# Patient Record
(demographics unavailable — no encounter records)

---

## 2024-10-24 NOTE — PHYSICAL EXAM
Upon review of the In Basket request we were able to locate, review, and update the patient chart as requested for Diabetic Foot Exam     Any additional questions or concerns should be emailed to the Practice Liaisons via [email or web address] email, please do not reply via In Basket      Thank you  Zi Parish MA [Alert] : alert [No Acute Distress] : no acute distress [Normocephalic] : normocephalic [Conjunctivae with no discharge] : conjunctivae with no discharge [PERRL] : PERRL [EOMI Bilateral] : EOMI bilateral [Auricles Well Formed] : auricles well formed [Clear Tympanic membranes with present light reflex and bony landmarks] : clear tympanic membranes with present light reflex and bony landmarks [No Discharge] : no discharge [Nares Patent] : nares patent [Pink Nasal Mucosa] : pink nasal mucosa [Palate Intact] : palate intact [Nonerythematous Oropharynx] : nonerythematous oropharynx [Supple, full passive range of motion] : supple, full passive range of motion [No Palpable Masses] : no palpable masses [Symmetric Chest Rise] : symmetric chest rise [Clear to Auscultation Bilaterally] : clear to auscultation bilaterally [Regular Rate and Rhythm] : regular rate and rhythm [Normal S1, S2 present] : normal S1, S2 present [No Murmurs] : no murmurs [+2 Femoral Pulses] : +2 femoral pulses [Soft] : soft [NonTender] : non tender [Non Distended] : non distended [Normoactive Bowel Sounds] : normoactive bowel sounds [No Hepatomegaly] : no hepatomegaly [No Splenomegaly] : no splenomegaly [Maximo: _____] : Maximo [unfilled] [Testicles Descended Bilaterally] : testicles descended bilaterally [Patent] : patent [No fissures] : no fissures [No Abnormal Lymph Nodes Palpated] : no abnormal lymph nodes palpated [No Gait Asymmetry] : no gait asymmetry [No pain or deformities with palpation of bone, muscles, joints] : no pain or deformities with palpation of bone, muscles, joints [Normal Muscle Tone] : normal muscle tone [Straight] : straight [+2 Patella DTR] : +2 patella DTR [Cranial Nerves Grossly Intact] : cranial nerves grossly intact [No Rash or Lesions] : no rash or lesions

## 2024-10-24 NOTE — DISCUSSION/SUMMARY
[Normal Growth] : growth [Normal Development] : development [No Elimination Concerns] : elimination [No Feeding Concerns] : feeding [No Skin Concerns] : skin [Normal Sleep Pattern] : sleep [ADHD] : attention deficit hyperactivity disorder [School Readiness] : school readiness [Mental Health] : mental health [Nutrition and Physical Activity] : nutrition and physical activity [Oral Health] : oral health [Safety] : safety [No Medications] : ~He/She~ is not on any medications [Full Activity without restrictions including Physical Education & Athletics] : Full Activity without restrictions including Physical Education & Athletics [] : The components of the vaccine(s) to be administered today are listed in the plan of care. The disease(s) for which the vaccine(s) are intended to prevent and the risks have been discussed with the caretaker.  The risks are also included in the appropriate vaccination information statements which have been provided to the patient's caregiver.  The caregiver has given consent to vaccinate. [FreeTextEntry1] : Continue balanced diet with all food groups. Brush teeth twice a day with toothbrush. Recommend visit to dentist. Help child to maintain consistent daily routines and sleep schedule. School discussed. Ensure home is safe. Teach child about personal safety. Use consistent, positive discipline. Limit screen time to no more than 2 hours per day. Encourage physical activity.  Return 1 year for routine well child check. routine labs normal last year ADHD: continue to take him to OT and behavioral therapy and discussed having the medications at home. If parents want to start him on the medication call first to go over side effects and routine.

## 2024-10-24 NOTE — HISTORY OF PRESENT ILLNESS
[Father] : father [2% ___ oz/d] : consumes [unfilled] oz of 2%  milk per day [Sugar drinks] : sugar drinks [Fruit] : fruit [Vegetables] : vegetables [Meat] : meat [Grains] : grains [Eggs] : eggs [Fish] : fish [Dairy] : dairy [___ stools per day] : [unfilled]  stools per day [Firm] : stools are firm consistency [Toilet Trained] : toilet trained [Normal] : Normal [In own bed] : In own bed [Brushing teeth] : Brushing teeth [Yes] : Patient goes to dentist yearly [Toothpaste] : Primary Fluoride Source: Toothpaste [Playtime (60 min/d)] : Playtime 60 min a day [Appropiate parent-child-sibling interaction] : Appropriate parent-child-sibling interaction [Child Oppositional] : Child oppositional [Parent has appropriate responses to behavior] : Parent has appropriate responses to behavior [Grade ___] : Grade [unfilled] [Special Education] : receives special education  [Parent/teacher concerns] : Parent/teacher concerns [Adequate performance] : Adequate performance [No] : Not at  exposure [Water heater temperature set at <120 degrees F] : Water heater temperature set at <120 degrees F [Car seat in back seat] : Car seat in back seat [Carbon Monoxide Detectors] : Carbon monoxide detectors [Smoke Detectors] : Smoke detectors [Supervised outdoor play] : Supervised outdoor play [Exposure to electronic nicotine delivery system] : No exposure to electronic nicotine delivery system [Up to date] : Up to date [FreeTextEntry7] : 6 year old for his well visit [FreeTextEntry9] : has ADHD not treated with medication but getting OT and behavioral therapy [de-identified] : gets distracted, hyperactive, gets teased in school. Parents opting not to medicate yet.  [NO] : No

## 2024-11-26 NOTE — REASON FOR VISIT
[Initial Evaluation] : an initial evaluation for [Mother] : mother [FreeTextEntry2] : recurrent throat infections and snoring.

## 2024-11-26 NOTE — ASSESSMENT
[FreeTextEntry1] : We reviewed routine infectious criterion for tonsillectomy. We are normally looking for 7 episodes in one year, 5 episodes in two consecutive years, or 3 episodes in each of three years (known as the [place] criteria). We do sometimes modify these if there is overlap with VIKASH, such as the case with this patient who is having increasingly worsening sleep symptoms. We discussed options of watchful waiting, obtaining sleep study, or proceeding with adenotonsillectomy given significant symptoms correlating with physical exam.   We discussed the risks, benefits, alternatives, and personnel involved in adenotonsillectomy. The risks include, but are not limited to, post-tonsillectomy bleeding that can range from minimal to life threatening, dehydration, throat pain, and speech changes. Expectations of one week out of school, two weeks out of sports/P.E., need for encouragement of fluid intake, limitation of diet to restrict hard/crunchy foods were discussed. If bleeding were to occur post-operatively parent is to bring the child immediately to the nearest emergency department.   The parent expresses understanding and wishes to proceed. Will plan for post-operative evaluation approximately one month post operatively.

## 2024-11-26 NOTE — HISTORY OF PRESENT ILLNESS
[No Personal or Family History of Easy Bruising, Bleeding, or Issues with General Anesthesia] : No Personal or Family History of easy bruising, bleeding, or issues with general anesthesia [de-identified] : 6 year old male presents for initial evaluation of recurring strep and snoring.  Hx of ADHD. Followed by neuro for ADHD. Here with mom who provides history.   Approximately with 4 strep infections this past year and 4-5 infections in the previous year.  Usually with fevers, throat pain, decreased appetite. Missing school.  Last episode of was last week where he completed course of amoxicillin.  Left lymph node increases in size when sick. Has had lymph node ultrasound in the past.  Also had mono on one emergency room visit. No previous head and neck surgery. Mom with history of tonsillectomy.  No previous sleep study.    Snoring at night that started approximately 1 year. No choking, gasping, or witnessed apneas.  Eating and drinking well, no difficulty swallowing or breathing.  Sleeps up to 12 hours per night. Mom unsure if waking up frequently.  Now with enuresis for past year.  Hyperactivity during the day with ADHD diagnosis.   Born at 35 weeks, no nicu stay, passed NBHS No hearing or speech concerns. No recurrent ear infections.  No other otolaryngology concerns.

## 2024-11-26 NOTE — DATA REVIEWED
[FreeTextEntry1] : Ultrasound Neck 4/30/24, personally reviewed: 2.1cm left level 5 lymph node normal architecture

## 2024-11-26 NOTE — CONSULT LETTER
[Dear  ___] : Dear  [unfilled], [Courtesy Letter:] : I had the pleasure of seeing your patient, [unfilled], in my office today. [Please see my note below.] : Please see my note below. [Sincerely,] : Sincerely, [FreeTextEntry3] : Latoya Hills M.D. [place] [address] Tel [phone] Fax [phone]

## 2024-11-26 NOTE — PHYSICAL EXAM
[Normal Gait and Station] : normal gait and station [Normal muscle strength, symmetry and tone of facial, head and neck musculature] : normal muscle strength, symmetry and tone of facial, head and neck musculature [Normal] : no cervical lymphadenopathy [Exposed Vessel] : left anterior vessel not exposed [Increased Work of Breathing] : no increased work of breathing with use of accessory muscles and retractions [de-identified] : 3-4+ tonsils

## 2025-01-28 NOTE — CONSULT LETTER
[Today's Date] : [unfilled] [Name] : Name: [unfilled] [] : : ~~ [Today's Date:] : [unfilled] [Dear  ___:] : Dear Dr. [unfilled]: [Consult] : I had the pleasure of evaluating your patient, [unfilled]. My full evaluation follows. [Consult - Single Provider] : Thank you very much for allowing me to participate in the care of this patient. If you have any questions, please do not hesitate to contact me. [Sincerely,] : Sincerely, [FreeTextEntry4] : Lee Sinclair, NP, RN [FreeTextEntry5] : [address] [place] [address], [address], [place] [address]

## 2025-01-28 NOTE — CARDIOLOGY SUMMARY
[Today's Date] : [unfilled] [FreeTextEntry1] : Normal sinus rhythm with a ventricular rate of 85 beats per minute. Normal ventricular axis (normal QRS axis of 91 degrees) and normal intervals for age.

## 2025-02-10 NOTE — PHYSICAL EXAM
[General Appearance - Alert] : alert [General Appearance - Well Developed] : interactive [General Appearance - Well-Appearing] : well appearing [General Appearance - In No Acute Distress] : in no acute distress [Sclera] : the sclera were normal [Outer Ear] : the ears and nose were normal in appearance [Both Tympanic Membranes Were Examined] : both tympanic membranes were normal [Normal Appearance] : was normal in appearance [] : no respiratory distress [Respiration, Rhythm And Depth] : normal respiratory rhythm and effort [Heart Rate And Rhythm] : heart rate and rhythm were normal [Heart Sounds] : normal S1 and S2 [Murmurs] : no murmurs [Bowel Sounds] : normal bowel sounds [Abdomen Soft] : soft [Abnormal Walk] : normal gait [Delayed Developmental Milestones] : normal neurologic development for age [Left] : on the left [Mobile] : mobile [Hard] : soft [Rubbery] : rubbery [Normal] : normal texture and mobility [Initial Inspection: Infant Active And Alert] : active and alert [Demonstrated Behavior - Infant Nonreactive To Parents] : interactive [Penis Abnormality] : the penis was normal [Maximo Stage _____] : the Maximo stage for pubic hair development was [unfilled]

## 2025-02-10 NOTE — HISTORY OF PRESENT ILLNESS
[Preoperative Visit] : for a medical evaluation prior to surgery [Good] : Good [Sleep Apnea] : sleep apnea [Cough] : no cough [Appetite] : no decrease in appetite [Nausea] : no nausea [Vomiting] : no vomiting [Abdominal Pain] : no abdominal pain [Diarrhea] : no diarrhea [Easy Bruising] : no easy bruising [Rash] : no rash [Dysuria] : no dysuria [Urinary Frequency] : no urinary frequency [Prior Anesthesia] : No prior anesthesia [Prev Anesthesia Reaction] : no previous anesthesia reaction [Diabetes] : no diabetes [Pulmonary Disease] : no pulmonary disease [Transfusion Reaction] : no transfusion reaction [Impaired Immunity] : no impaired immunity [Frequent use of NSAIDs] : no use of NSAIDs [Anesthesia Reaction] : no anesthesia reaction [Clotting Disorder] : no clotting disorder [Bleeding Disorder] : no bleeding disorder [Sudden Death] : no sudden death [FreeTextEntry2] : 02/14/2025 [de-identified] : [place] [FreeTextEntry1] : 6 year boy  walked into office on an emergency basis, Father claimed he had an appointment for presurgical clearance and spoke to someone last week, but there is no evidence of this anywhere. interrupting work schedule.

## 2025-03-20 NOTE — HISTORY OF PRESENT ILLNESS
[de-identified] : Mike is a 6 year old here for follow up for T&A on 2/14/25.  Here with dad who provides history.   Recovered well from surgery. No bleeding.  Improvement in sleep symptoms.  No throat infections.  No other otolaryngology concerns today.

## 2025-03-20 NOTE — ASSESSMENT
[FreeTextEntry1] : Recovered well s/p T&A. No dietary or activity restrictions at this time. Follow up with ENT as needed.

## 2025-03-20 NOTE — REASON FOR VISIT
[Subsequent Evaluation] : a subsequent evaluation for [Sleep Apnea/ Snoring] : sleep apnea/ snoring [Father] : father [FreeTextEntry2] :  Adenotonsillectomy.

## 2025-03-20 NOTE — CONSULT LETTER
[Dear  ___] : Dear  [unfilled], [Courtesy Letter:] : I had the pleasure of seeing your patient, [unfilled], in my office today. [Please see my note below.] : Please see my note below. [Sincerely,] : Sincerely, [FreeTextEntry3] : Latoya Hills M.[place]. [place] [address] Tel [phone] Fax [phone]

## 2025-03-20 NOTE — PHYSICAL EXAM
[Exposed Vessel] : left anterior vessel not exposed [Surgically Absent] : surgically absent [Increased Work of Breathing] : no increased work of breathing with use of accessory muscles and retractions [Normal Gait and Station] : normal gait and station [Normal muscle strength, symmetry and tone of facial, head and neck musculature] : normal muscle strength, symmetry and tone of facial, head and neck musculature [Normal] : no cervical lymphadenopathy

## 2025-04-02 NOTE — PLAN
[FreeTextEntry1] : - Mother to send copy of current IEP. Continue classroom accommodations.  - continue counseling 1x/week.  -  Continue Omega 3 fish oil - Increase Focalin XR to 10mg. S/E discussed.  - Follow up x 1 month. Call sooner if any concerns or s/e of medication.

## 2025-04-02 NOTE — HISTORY OF PRESENT ILLNESS
[FreeTextEntry1] :  is a 6-year-old male here for f/u evaluation of ADHD, Hyperactive type along w/ behavioral concerns.  Education: 1st grade, ICT setting. Uncertain if IEP still active.    f/u 04/02/2025 Last visit, Focalin prescribed. However not started ~ March.  Prior to starting medication, extracurricular activities were attempted to manage his energy levels, but these proved ineffective.  There was initial hesitancy to start medication, particularly from Dave father, and Focalin wasn't initiated until ~ March. Mother denies side effects. Of note, mother reports T&A x 02/14.  Mother reports minimal change in Dave behavior since starting Focalin, despite administering it on both school days and, as a trial, over a weekend. Continued hyperactivity leads mom to question the medication's effectiveness and to wonder if the observed behaviors are simply due to Dave mischievous nature. At school, a behavioral chart system has shown some benefit, with a mix of good and bad days.  He currently sees a guidance counselor twice a month and an outside therapist weekly.  f/u 09/16/2024 Attended day camp over the summer. + behavioral concerns/issues w/ another camper.  Since last visit, completed . Rec'd 2s for behavior. Academically performing on grade level.  Currently in 1st grade. ICT setting. Unclear if IEP still active. Mother reports stopped OT?  Denied for Para request, for behavioral concerns.  + accommodations, including counseling.  Cleared by cardiology for start of stimulant.   ______________________________________________________ Initial Visit: 06/03/2024 Early development:  was born full term via NVD. he was discharged home with mother. he hit all early developmental milestones appropriately.  Evaluated through [facility] for behavioral concerns, approved for services (mother can't remember exactly). Had someone who came to school to help redirect .    Educational assessment: Current Grade: .  Current District: [address]  General ED/Any Accommodations/ICT in place: Currently in an ICT setting and IEP in place. Classification:  student with a disability. Also, rec's OT 2x/week. Academically performing on grade level.  Teachers concerns: Inability to stay on task and focus; disruptive; needing redirection multiple times. Seat in school has been moved many times due to behavior. Tends to walk around, doesn't sit still during [place] time.    Home assessment: Spends half time with mom, half time with dad. Able to do hw independently. However, after a few minutes needs to be redirected. Needs frequent breaks which helps. Unable to sit through whole meal without getting up.  At home: Very easily distracted, needing frequent redirection, difficulty with multi-step instructions  Hyperactivity: Fidgety Impulsivity: When frustrated - self inflicting behavior and talks down about himself.    Social Concerns: - Sleep: sleeps well Eating: eats a varied diet Play: cars. park.    Family hx of developmental delays/ADD/ADHD: -  Co- morbidities: No concern for anxiety, depression, OCD Other health concerns: Denies staring, twitching, seizure or seizure-like activity. No serious head injury, meningoencephalitis

## 2025-04-02 NOTE — BIRTH HISTORY
[At ___ Weeks Gestation] : at [unfilled] weeks gestation [[place] [Normal Vaginal Route] : by normal vaginal route [Age Appropriate] : age appropriate developmental milestones not met [FreeTextEntry4] : Denies NICU stay.

## 2025-04-02 NOTE — PHYSICAL EXAM
[Well-appearing] : well-appearing [Alert] : alert [Well related, good eye contact] : well related, good eye contact [Conversant] : conversant [Normal speech and language] : normal speech and language [Follows instructions well] : follows instructions well [No abnormal involuntary movements] : no abnormal involuntary movements [de-identified] : limited due to tele visit.

## 2025-04-02 NOTE — REASON FOR VISIT
[Mother] : mother [Home] : at home, [unfilled] , at the time of the visit. [Other Location: e.g. Home (Enter Location, City,State)___] : at [unfilled] [Telehealth (audio & video)] : This visit was provided via telehealth using real-time 2-way audio visual technology. [Verbal consent obtained from patient] : the patient, [unfilled] [Follow-Up Evaluation] : a follow-up evaluation for [ADHD] : ADHD [Patient] : patient [Parents] : parents [Medical Records] : medical records

## 2025-04-02 NOTE — ASSESSMENT
[FreeTextEntry1] :  is a relatively healthy 6-year-old boy being seen as f/u visit for ADHD, hyperactive type along w/ behavioral concerns. Currently in 1st grade, ICT setting and IEP in place. Last visit, Focalin prescribed, but was not started until March, preceded by unsuccessful attempts to manage his hyperactivity with extracurricular activities. Mother reports minimal change in behavior since starting medication, even after a weekend trial. She questions its effectiveness and wonders if the observed behaviors are due to mischievousness rather than ADHD. A school behavioral chart system shows mixed results. The patient sees a guidance counselor twice a month and an outside therapist weekly. An increase to 10mg was discussed to assess for potential improvement.  MOC verbalized understanding. All questions answered.

## 2025-04-02 NOTE — CONSULT LETTER
[Dear  ___] : Dear  [unfilled], [Consult Letter:] : I had the pleasure of evaluating your patient, [unfilled]. [Please see my note below.] : Please see my note below. [Consult Closing:] : Thank you very much for allowing me to participate in the care of this patient.  If you have any questions, please do not hesitate to contact me. [Sincerely,] : Sincerely, [FreeTextEntry3] : CYNTHIA Thomas Certified Pediatric Nurse Practitioner  Pediatric Neurology  [facility]

## 2025-04-02 NOTE — DATA REVIEWED
[FreeTextEntry1] : [place] forms reviewed:   Parents responses:  Inattention 3/9- (6/9).  +Hyperactivity 7/9 (6/9) ~ ODD: 3/8. (4/8) 19-26  Conduct disorder: 0/14 (3/14) 27-40 +Anxiety/ Depression: 3/7 (3/7)   Teachers responses:  Inattention 2/9- (6/9). ~ Hyperactivity 5/9 (6/9)  ODD/ Conduct: 0/10. (3/10) 19-28  Anxiety/ Depression:  0/7 (3/7)

## 2025-05-12 NOTE — ASSESSMENT
[FreeTextEntry1] :  is a relatively healthy 6-year-old boy being seen as f/u visit for ADHD, hyperactive type along w/ behavioral concerns. Currently in 1st grade, ICT setting and IEP in place. Rec's in school counsling 2x/week and outside therapy weekly. Last visit, Focalin was increased to 10mg, w/o significant improvement. s/e - increased irritability and easily frustrated. At school, behavioral chart implemented which has resulted in good/bad days. Academically, he is performing at grade level. Non focal exam.   During visit, discussed how finding the right medication and dosage for ADHD can often involve a trial-and-error process to achieve optimal results with minimal side effects. Given Dave reported increased irritability and frustration while on Focalin, alternative stimulant options were explored. A trial of Ritalin 10mg will be initiated, and potential side effects were discussed. All questions answered.

## 2025-05-12 NOTE — CONSULT LETTER
[Dear  ___] : Dear  [unfilled], [Consult Letter:] : I had the pleasure of evaluating your patient, [unfilled]. [Please see my note below.] : Please see my note below. [Consult Closing:] : Thank you very much for allowing me to participate in the care of this patient.  If you have any questions, please do not hesitate to contact me. [Sincerely,] : Sincerely, [FreeTextEntry3] : CYNTHIA Thomas Certified Pediatric Nurse Practitioner  Pediatric Neurology  [facility]

## 2025-05-12 NOTE — DATA REVIEWED
[FreeTextEntry1] : [place] forms reviewed:   Parents responses:  Inattention 3/9- (6/9).  +Hyperactivity 7/9 (6/9) ~ ODD: 3/8. (4/8) 19-26  Conduct disorder: 0/14 (3/14) 27-40 +Anxiety/ Depression: 3/7 (3/7)   Teachers responses:  Inattention 2/9- (6/9). ~ Hyperactivity 5/9 (6/9)  ODD/ Conduct: 0/10. (3/10) 19-28  Anxiety/ Depression:  0/7 (3/7)

## 2025-05-12 NOTE — PLAN
[FreeTextEntry1] : - Mother to send copy of current IEP. Continue classroom accommodations.  - continue outside counseling 1x/week.  -  Continue Omega 3 fish oil - Start Ritalin xr 10mg - Follow up x 1 month. Call sooner if any concerns or s/e of medication.

## 2025-05-12 NOTE — PHYSICAL EXAM
[Well-appearing] : well-appearing [Alert] : alert [Well related, good eye contact] : well related, good eye contact [Conversant] : conversant [Normal speech and language] : normal speech and language [Follows instructions well] : follows instructions well [No abnormal involuntary movements] : no abnormal involuntary movements [No deformities] : no deformities [Full extraocular movements] : full extraocular movements [Gross hearing intact] : gross hearing intact [Normal tongue movement] : normal tongue movement [Gets up on table without difficulty] : gets up on table without difficulty [5/5 strength in proximal and distal muscles of arms and legs] : 5/5 strength in proximal and distal muscles of arms and legs [Good walking balance] : good walking balance [Normal gait] : normal gait [de-identified] : No respiratory distress noted.

## 2025-05-12 NOTE — HISTORY OF PRESENT ILLNESS
[FreeTextEntry1] :  is a 6-year-old male here for f/u evaluation of ADHD, Hyperactive type along w/ behavioral concerns.  Education: 1st grade, ICT setting. Uncertain if IEP still active.   Sees guidance counselor 2x/month and outside therapist 1x/week.   f/u 05/12/2025 Last visit, Focalin was increased to 10mg, which is administered 6days/week. Typically off Sundays. s/e - increased frustration and irritability.  Mother reports improved behavior, whereas father attributes this to the threat of not being able to play soccer if his behavior doesn't improve. Even with the medication, mother reports continued good/bad days. Will occasionally administer the medication on weekends to observe its effects but hasn't noticed consistent differences.  Academically,  remains on grade level with no new concerns from teachers.  Behaviorally, a chart is being used at school, and he continues to experience both good and bad days.  His IEP has been updated, will be removed from ICT next year but will continue counseling and OT.  f/u 04/02/2025 Last visit, Focalin prescribed. However not started ~ March.  Prior to starting medication, extracurricular activities were attempted to manage his energy levels, but these proved ineffective.  There was initial hesitancy to start medication, particularly from Dave father, and Focalin wasn't initiated until ~ March. Mother denies side effects. Of note, mother reports T&A x 02/14.  Mother reports minimal change in Dave behavior since starting Focalin, despite administering it on both school days and, as a trial, over a weekend. Continued hyperactivity leads mom to question the medication's effectiveness and to wonder if the observed behaviors are simply due to [place] mischievous nature. At school, a behavioral chart system has shown some benefit, with a mix of good and bad days.  He currently sees a guidance counselor twice a month and an outside therapist weekly.  f/u 09/16/2024 Attended day camp over the summer. + behavioral concerns/issues w/ another camper.  Since last visit, completed . Rec'd 2s for behavior. Academically performing on grade level.  Currently in 1st grade. ICT setting. Unclear if IEP still active. Mother reports stopped OT?  Denied for Para request, for behavioral concerns.  + accommodations, including counseling.  Cleared by cardiology for start of stimulant.   ______________________________________________________ Initial Visit: 06/03/2024 Early development:  was born full term via NVD. he was discharged home with mother. he hit all early developmental milestones appropriately.  Evaluated through [facility] for behavioral concerns, approved for services (mother can't remember exactly). Had someone who came to school to help redirect .    Educational assessment: Current Grade: .  Current District: [address]  General ED/Any Accommodations/ICT in place: Currently in an ICT setting and IEP in place. Classification:  student with a disability. Also, rec's OT 2x/week. Academically performing on grade level.  Teachers concerns: Inability to stay on task and focus; disruptive; needing redirection multiple times. Seat in school has been moved many times due to behavior. Tends to walk around, doesn't sit still during [place] time.    Home assessment: Spends half time with mom, half time with dad. Able to do hw independently. However, after a few minutes needs to be redirected. Needs frequent breaks which helps. Unable to sit through whole meal without getting up.  At home: Very easily distracted, needing frequent redirection, difficulty with multi-step instructions  Hyperactivity: Fidgety Impulsivity: When frustrated - self inflicting behavior and talks down about himself.    Social Concerns: - Sleep: sleeps well Eating: eats a varied diet Play: cars. park.    Family hx of developmental delays/ADD/ADHD: -  Co- morbidities: No concern for anxiety, depression, OCD Other health concerns: Denies staring, twitching, seizure or seizure-like activity. No serious head injury, meningoencephalitis